# Patient Record
Sex: FEMALE | Race: BLACK OR AFRICAN AMERICAN | NOT HISPANIC OR LATINO | Employment: PART TIME | URBAN - METROPOLITAN AREA
[De-identification: names, ages, dates, MRNs, and addresses within clinical notes are randomized per-mention and may not be internally consistent; named-entity substitution may affect disease eponyms.]

---

## 2024-05-30 ENCOUNTER — HOSPITAL ENCOUNTER (EMERGENCY)
Facility: HOSPITAL | Age: 24
Discharge: LEFT BEFORE DISCHARGE | End: 2024-05-30
Payer: COMMERCIAL

## 2024-05-30 ENCOUNTER — APPOINTMENT (OUTPATIENT)
Dept: RADIOLOGY | Facility: HOSPITAL | Age: 24
End: 2024-05-30
Payer: COMMERCIAL

## 2024-05-30 VITALS
OXYGEN SATURATION: 100 % | DIASTOLIC BLOOD PRESSURE: 75 MMHG | WEIGHT: 189 LBS | HEIGHT: 63 IN | TEMPERATURE: 98.6 F | SYSTOLIC BLOOD PRESSURE: 137 MMHG | HEART RATE: 75 BPM | BODY MASS INDEX: 33.49 KG/M2

## 2024-05-30 DIAGNOSIS — Z53.21 ELOPED FROM EMERGENCY DEPARTMENT: Primary | ICD-10-CM

## 2024-05-30 DIAGNOSIS — R03.0 ELEVATED BLOOD PRESSURE READING: ICD-10-CM

## 2024-05-30 DIAGNOSIS — Z87.42 HISTORY OF VAGINAL BLEEDING: ICD-10-CM

## 2024-05-30 LAB
ABO GROUP (TYPE) IN BLOOD: NORMAL
ALBUMIN SERPL BCP-MCNC: 4.5 G/DL (ref 3.4–5)
ALP SERPL-CCNC: 48 U/L (ref 33–110)
ALT SERPL W P-5'-P-CCNC: 6 U/L (ref 7–45)
ANION GAP SERPL CALC-SCNC: 11 MMOL/L (ref 10–20)
ANTIBODY SCREEN: NORMAL
APPEARANCE UR: ABNORMAL
AST SERPL W P-5'-P-CCNC: 14 U/L (ref 9–39)
B-HCG SERPL-ACNC: <2 MIU/ML
BASOPHILS # BLD AUTO: 0.02 X10*3/UL (ref 0–0.1)
BASOPHILS NFR BLD AUTO: 0.4 %
BILIRUB SERPL-MCNC: 1.3 MG/DL (ref 0–1.2)
BILIRUB UR STRIP.AUTO-MCNC: NEGATIVE MG/DL
BUN SERPL-MCNC: 6 MG/DL (ref 6–23)
CALCIUM SERPL-MCNC: 9 MG/DL (ref 8.6–10.3)
CHLORIDE SERPL-SCNC: 106 MMOL/L (ref 98–107)
CO2 SERPL-SCNC: 26 MMOL/L (ref 21–32)
COLOR UR: YELLOW
CREAT SERPL-MCNC: 0.78 MG/DL (ref 0.5–1.05)
EGFRCR SERPLBLD CKD-EPI 2021: >90 ML/MIN/1.73M*2
EOSINOPHIL # BLD AUTO: 0.22 X10*3/UL (ref 0–0.7)
EOSINOPHIL NFR BLD AUTO: 4.6 %
ERYTHROCYTE [DISTWIDTH] IN BLOOD BY AUTOMATED COUNT: 13.9 % (ref 11.5–14.5)
GLUCOSE SERPL-MCNC: 100 MG/DL (ref 74–99)
GLUCOSE UR STRIP.AUTO-MCNC: NORMAL MG/DL
HCT VFR BLD AUTO: 42 % (ref 36–46)
HGB BLD-MCNC: 13.9 G/DL (ref 12–16)
HYALINE CASTS #/AREA URNS AUTO: ABNORMAL /LPF
IMM GRANULOCYTES # BLD AUTO: 0.01 X10*3/UL (ref 0–0.7)
IMM GRANULOCYTES NFR BLD AUTO: 0.2 % (ref 0–0.9)
KETONES UR STRIP.AUTO-MCNC: NEGATIVE MG/DL
LACTATE SERPL-SCNC: 1.2 MMOL/L (ref 0.4–2)
LEUKOCYTE ESTERASE UR QL STRIP.AUTO: NEGATIVE
LIPASE SERPL-CCNC: 11 U/L (ref 9–82)
LYMPHOCYTES # BLD AUTO: 1.66 X10*3/UL (ref 1.2–4.8)
LYMPHOCYTES NFR BLD AUTO: 34.4 %
MCH RBC QN AUTO: 29.9 PG (ref 26–34)
MCHC RBC AUTO-ENTMCNC: 33.1 G/DL (ref 32–36)
MCV RBC AUTO: 90 FL (ref 80–100)
MONOCYTES # BLD AUTO: 0.36 X10*3/UL (ref 0.1–1)
MONOCYTES NFR BLD AUTO: 7.5 %
MUCOUS THREADS #/AREA URNS AUTO: ABNORMAL /LPF
NEUTROPHILS # BLD AUTO: 2.56 X10*3/UL (ref 1.2–7.7)
NEUTROPHILS NFR BLD AUTO: 52.9 %
NITRITE UR QL STRIP.AUTO: NEGATIVE
NRBC BLD-RTO: 0 /100 WBCS (ref 0–0)
PH UR STRIP.AUTO: 7 [PH]
PLATELET # BLD AUTO: 246 X10*3/UL (ref 150–450)
POTASSIUM SERPL-SCNC: 3.5 MMOL/L (ref 3.5–5.3)
PROT SERPL-MCNC: 7.3 G/DL (ref 6.4–8.2)
PROT UR STRIP.AUTO-MCNC: ABNORMAL MG/DL
RBC # BLD AUTO: 4.65 X10*6/UL (ref 4–5.2)
RBC # UR STRIP.AUTO: ABNORMAL /UL
RBC #/AREA URNS AUTO: ABNORMAL /HPF
RH FACTOR (ANTIGEN D): NORMAL
SODIUM SERPL-SCNC: 139 MMOL/L (ref 136–145)
SP GR UR STRIP.AUTO: 1.03
SQUAMOUS #/AREA URNS AUTO: ABNORMAL /HPF
UROBILINOGEN UR STRIP.AUTO-MCNC: ABNORMAL MG/DL
WBC # BLD AUTO: 4.8 X10*3/UL (ref 4.4–11.3)
WBC #/AREA URNS AUTO: ABNORMAL /HPF

## 2024-05-30 PROCEDURE — 36415 COLL VENOUS BLD VENIPUNCTURE: CPT

## 2024-05-30 PROCEDURE — 84702 CHORIONIC GONADOTROPIN TEST: CPT

## 2024-05-30 PROCEDURE — 76856 US EXAM PELVIC COMPLETE: CPT | Performed by: RADIOLOGY

## 2024-05-30 PROCEDURE — 99284 EMERGENCY DEPT VISIT MOD MDM: CPT | Mod: 25

## 2024-05-30 PROCEDURE — 85025 COMPLETE CBC W/AUTO DIFF WBC: CPT

## 2024-05-30 PROCEDURE — 86901 BLOOD TYPING SEROLOGIC RH(D): CPT

## 2024-05-30 PROCEDURE — 81001 URINALYSIS AUTO W/SCOPE: CPT

## 2024-05-30 PROCEDURE — 76856 US EXAM PELVIC COMPLETE: CPT

## 2024-05-30 PROCEDURE — 76830 TRANSVAGINAL US NON-OB: CPT | Performed by: RADIOLOGY

## 2024-05-30 PROCEDURE — 83690 ASSAY OF LIPASE: CPT

## 2024-05-30 PROCEDURE — 84075 ASSAY ALKALINE PHOSPHATASE: CPT

## 2024-05-30 PROCEDURE — 83605 ASSAY OF LACTIC ACID: CPT

## 2024-05-30 RX ORDER — ACETAMINOPHEN 325 MG/1
975 TABLET ORAL ONCE
Status: COMPLETED | OUTPATIENT
Start: 2024-05-30 | End: 2024-05-30

## 2024-05-30 RX ORDER — KETOROLAC TROMETHAMINE 30 MG/ML
15 INJECTION, SOLUTION INTRAMUSCULAR; INTRAVENOUS ONCE
Status: DISCONTINUED | OUTPATIENT
Start: 2024-05-30 | End: 2024-05-30 | Stop reason: HOSPADM

## 2024-05-30 RX ADMIN — ACETAMINOPHEN 975 MG: 325 TABLET ORAL at 10:33

## 2024-05-30 ASSESSMENT — PAIN DESCRIPTION - PAIN TYPE: TYPE: ACUTE PAIN

## 2024-05-30 ASSESSMENT — COLUMBIA-SUICIDE SEVERITY RATING SCALE - C-SSRS
6. HAVE YOU EVER DONE ANYTHING, STARTED TO DO ANYTHING, OR PREPARED TO DO ANYTHING TO END YOUR LIFE?: NO
2. HAVE YOU ACTUALLY HAD ANY THOUGHTS OF KILLING YOURSELF?: NO
1. IN THE PAST MONTH, HAVE YOU WISHED YOU WERE DEAD OR WISHED YOU COULD GO TO SLEEP AND NOT WAKE UP?: NO

## 2024-05-30 ASSESSMENT — PAIN - FUNCTIONAL ASSESSMENT: PAIN_FUNCTIONAL_ASSESSMENT: 0-10

## 2024-05-30 ASSESSMENT — PAIN SCALES - GENERAL: PAINLEVEL_OUTOF10: 9

## 2024-05-30 ASSESSMENT — PAIN DESCRIPTION - LOCATION: LOCATION: PELVIS

## 2024-05-30 NOTE — ED PROVIDER NOTES
HPI   Chief Complaint   Patient presents with    Vaginal Bleeding - Pregnant       24-year-old female with past medical history of seizures presents the ED today with a chief concern of vaginal bleeding.  Patient states symptoms started 7 to 8 days ago.  She states that initially she thought it was a normal menstrual cycle however she took a pregnancy test yesterday and it was positive.  She states that the first date of her last menstrual period was 4/18/2024.  She states that she has cramping in the suprapubic region without radiation.  She states that she is been going there about 6-7 pads a day or 1 pad every 2 hours.  States that this was a little more blood than she normally expects for her typical period.  Denies any fever/chills.  She did have a couple episodes of nonbloody nonbilious vomiting last week.  She denies any chest pain or shortness of breath.  Denies lightheadedness, dizziness, or syncope.  Denies headache.  She is not soaking through pads every hour.  Denies any dysuria, urinary urgency/frequency, hematuria.  History of appendectomy.  She does have seizures does not take medication for this.  Has not had a seizure recently.  She is G2, P1.  Patient states that today the bleeding actually stopped however she still has some mild suprapubic cramping.  She is concerned that she may have had a miscarriage because her pregnancy test yesterday was positive.  She states that she is not bleeding at this time.  Denies vaginal discharge.  She has no other symptoms or concerns this time.      History provided by:  Patient   used: No                        No data recorded                   Patient History   Past Medical History:   Diagnosis Date    Chlamydial vulvovaginitis 09/07/2019    Chlamydia vaginitis/cervicitis    COVID-19 04/26/2021    COVID-19    Personal history of other diseases of the nervous system and sense organs 01/23/2020    History of seizure disorder    Personal  history of other infectious and parasitic diseases 2021    History of trichomoniasis    Personal history of other infectious and parasitic diseases 2020    History of herpes simplex infection     Past Surgical History:   Procedure Laterality Date    CT ANGIO NECK  2/15/2020    CT NECK ANGIO W AND WO IV CONTRAST 2/15/2020 [facility]    OTHER SURGICAL HISTORY  10/08/2020     section     No family history on file.  Social History     Tobacco Use    Smoking status: Not on file    Smokeless tobacco: Not on file   Substance Use Topics    Alcohol use: Not on file    Drug use: Not on file       Physical Exam   ED Triage Vitals [24 0942]   Temperature Heart Rate Resp BP   37 °C (98.6 °F) 75 -- 137/75      Pulse Ox Temp Source Heart Rate Source Patient Position   100 % Temporal Monitor Sitting      BP Location FiO2 (%)     Left arm --       Physical Exam  Constitutional: Vital signs per nursing notes.  Well developed, well nourished.  No acute distress.    Psychiatric: alert and oriented to person, place, and time; no abnormalities of mood or affect; memory intact  Eyes: PERRL; conjunctivae and lids normal  ENT: Ears normal externally; face symmetric. voice normal  Neck: neck supple, no meningismus; trachea midline without deviation  Respiratory: normal respiratory effort and excursion; no rales, rhonchi, or wheezes; equal air entry  Cardiovascular: RRR, 2+ pulses extremities   Neurological: normal speech; CN II-XII grossly intact, normal motor and sensory function; no nystagmus; no ataxia.  GI: no distention, soft.  Mild tenderness in suprapubic region.  No rebound tenderness or guarding.  Pneumothoraxes.  No pulsatile masses.  Remainder of abdomen is soft and benign.  : Normal external genitalia.  No ulcers or lesions identified.  No blood in vaginal vault.  No cervical motion tenderness.  Nursing staff present for this examination.  Musculoskeletal: normal gait and station; normal  digits and nails; normal to palpation; normal strength/tone; neurovascular status intact.  Skin: normal to inspection; normal to palpation; no rash    ED Course & MDM   ED Course as of 24 1305   u May 30, 2024   1246 FINDINGS:  UTERUS:  The retroverted uterus measures 8.2 x 4.8 x 5.9 cm. No uterine  masses. Prior  scar in the low anterior uterine segment.      ENDOMETRIUM:  The endometrium measures 0.4 cm. No focal abnormality.      RIGHT OVARY:  2.6 x 2.5 x 1.8 cm. No solid mass. Arterial and venous flow present.      LEFT OVARY:  3.3 x 1.8 x 2.4 cm. No solid mass. Arterial and venous flow present.      OTHER:  Trace physiologic pelvic free fluid.      IMPRESSION:  1. Normal sonographic appearance of the uterus and ovaries.  2. Prior  scar in the low anterior uterine segment.      Signed by: Emily Griffin 2024 11:54 AM  Dictation workstation:   [id number]   [MC]   1246 CBC shows no evidence of leukocytosis or anemia.  CMP shows no SAADIA or acute liver injury.  Glucose is 100.  Urinalysis shows no UTI.  Her urine is contaminated.  Lipase normal.  Type and screen shows Rh+ antibody negative.  Lactate normal.  Her beta hCG is normal and undetectable. []      ED Course User Index  [MC] Jonathan Smith PA-C         Diagnoses as of 24 1305   Eloped from emergency department   Elevated blood pressure reading   History of vaginal bleeding       Medical Decision Making  24-year-old female with past medical history of seizures presents the ED today with a chief concern of vaginal bleeding.  Vital signs reassuring.  Patient overall appears well and is nontoxic-appearing. Patient has full range of motion of the neck without any meningismus.  Satting 1 room air.  Not hypoxic.  Not tachycardic.  Afebrile.  I did ask her respiration rate recorded in the system.  Nursing staff had not yet recorded this prior to the patient leaving.  Patient had her CBC, CMP, urinalysis, hCG, and lactate  resulted and then she left without treatment being complete.  We are still pending type and screen and lipase.  We are also pending her ultrasound pelvis to be read.  I was pulled over by patient immediately after she came back from the ultrasound and she states that she wants to leave.  She states that she has a lot of things going on today and does not want to stay at the ED anymore.  She states that she would rather leave.  She feels safe returning home.  I advised patient that she should download the [employer] gavin and follow her results.  I also reviewed her ultrasound after she left which shows normal sonographic appearance of the uterus and ovaries.  There is a prior  scar in the lower anterior uterine segment.  I suspect her symptoms are probably from a normal menstrual cycle given that she is not pregnant and she is having vaginal bleeding with no abnormalities on the ultrasound.  Did have some mild tenderness in the suprapubic region however she is not having any UTI symptoms and her urine is contaminated.  Do not think UTI is likely at this time.  She is denying any vaginal discharge and I did not note any discharge on exam.  Low suspicion for PID or TOA or STD at this time.  Not concern for BV.  Do not think further workup with swabs are indicated at this time.  Patient was not able to have complete history, physical exam exam, and evaluation given to the fact that she left without treatment being complete.  I try to convince patient to stay and finish her workup however she is refusing and would rather leave at this time.  She understands the risks of leaving and is able to verbalize this back to me.    Differential diagnosis: Pregnant versus not pregnant, intrauterine pregnancy, ectopic pregnancy, subchorionic hemorrhage, acute intra-abdominal pathology, AAA    Disposition/treatment  1.  Eloped from the emergency department  2.  Elevated blood pressure reading  3.  History of vaginal  bleeding    Patient left without treatment being complete.  Again I try to convince patient to stay for remainder of workup however she is refusing.        Procedure  Procedures     Jonathan Smith PA-C  05/30/24 1305       Jonathan Smith PA-C  05/30/24 2622

## 2025-01-02 ENCOUNTER — APPOINTMENT (OUTPATIENT)
Dept: OBSTETRICS AND GYNECOLOGY | Facility: CLINIC | Age: 25
End: 2025-01-02
Payer: COMMERCIAL